# Patient Record
Sex: FEMALE | Race: BLACK OR AFRICAN AMERICAN | NOT HISPANIC OR LATINO | URBAN - METROPOLITAN AREA
[De-identification: names, ages, dates, MRNs, and addresses within clinical notes are randomized per-mention and may not be internally consistent; named-entity substitution may affect disease eponyms.]

---

## 2017-01-03 ENCOUNTER — EMERGENCY (EMERGENCY)
Facility: HOSPITAL | Age: 67
LOS: 1 days | Discharge: HOME | End: 2017-01-03
Attending: EMERGENCY MEDICINE | Admitting: EMERGENCY MEDICINE
Payer: COMMERCIAL

## 2017-01-03 VITALS
SYSTOLIC BLOOD PRESSURE: 146 MMHG | HEART RATE: 72 BPM | OXYGEN SATURATION: 97 % | TEMPERATURE: 98 F | RESPIRATION RATE: 18 BRPM | DIASTOLIC BLOOD PRESSURE: 84 MMHG

## 2017-01-03 VITALS
SYSTOLIC BLOOD PRESSURE: 138 MMHG | OXYGEN SATURATION: 98 % | DIASTOLIC BLOOD PRESSURE: 82 MMHG | HEART RATE: 66 BPM | RESPIRATION RATE: 18 BRPM | TEMPERATURE: 98 F

## 2017-01-03 DIAGNOSIS — Z90.710 ACQUIRED ABSENCE OF BOTH CERVIX AND UTERUS: Chronic | ICD-10-CM

## 2017-01-03 DIAGNOSIS — M79.662 PAIN IN LEFT LOWER LEG: ICD-10-CM

## 2017-01-03 DIAGNOSIS — O00.1 TUBAL PREGNANCY: Chronic | ICD-10-CM

## 2017-01-03 DIAGNOSIS — R25.2 CRAMP AND SPASM: ICD-10-CM

## 2017-01-03 DIAGNOSIS — Z98.89 OTHER SPECIFIED POSTPROCEDURAL STATES: Chronic | ICD-10-CM

## 2017-01-03 DIAGNOSIS — Z79.899 OTHER LONG TERM (CURRENT) DRUG THERAPY: ICD-10-CM

## 2017-01-03 LAB
ALBUMIN SERPL ELPH-MCNC: 3.5 G/DL — SIGNIFICANT CHANGE UP (ref 3.4–5)
ALP SERPL-CCNC: 56 U/L — SIGNIFICANT CHANGE UP (ref 40–120)
ALT FLD-CCNC: 21 U/L — SIGNIFICANT CHANGE UP (ref 12–42)
ANION GAP SERPL CALC-SCNC: 8 MMOL/L — LOW (ref 9–16)
APTT BLD: 31 SEC — SIGNIFICANT CHANGE UP (ref 27.5–37.4)
AST SERPL-CCNC: 17 U/L — SIGNIFICANT CHANGE UP (ref 15–37)
BASOPHILS NFR BLD AUTO: 0.5 % — SIGNIFICANT CHANGE UP (ref 0–2)
BILIRUB SERPL-MCNC: 0.3 MG/DL — SIGNIFICANT CHANGE UP (ref 0.2–1.2)
BUN SERPL-MCNC: 26 MG/DL — HIGH (ref 7–23)
CALCIUM SERPL-MCNC: 8.9 MG/DL — SIGNIFICANT CHANGE UP (ref 8.5–10.5)
CHLORIDE SERPL-SCNC: 111 MMOL/L — HIGH (ref 96–108)
CK SERPL-CCNC: 183 U/L — SIGNIFICANT CHANGE UP (ref 26–192)
CO2 SERPL-SCNC: 23 MMOL/L — SIGNIFICANT CHANGE UP (ref 22–31)
CREAT SERPL-MCNC: 1.06 MG/DL — SIGNIFICANT CHANGE UP (ref 0.5–1.3)
EOSINOPHIL NFR BLD AUTO: 3.4 % — SIGNIFICANT CHANGE UP (ref 0–6)
GLUCOSE SERPL-MCNC: 106 MG/DL — HIGH (ref 70–99)
HCT VFR BLD CALC: 36.5 % — SIGNIFICANT CHANGE UP (ref 34.5–45)
HGB BLD-MCNC: 12.3 G/DL — SIGNIFICANT CHANGE UP (ref 11.5–15.5)
INR BLD: 0.96 — SIGNIFICANT CHANGE UP (ref 0.88–1.16)
LYMPHOCYTES # BLD AUTO: 35.2 % — SIGNIFICANT CHANGE UP (ref 13–44)
MAGNESIUM SERPL-MCNC: 2.1 MG/DL — SIGNIFICANT CHANGE UP (ref 1.6–2.4)
MCHC RBC-ENTMCNC: 30.9 PG — SIGNIFICANT CHANGE UP (ref 27–34)
MCHC RBC-ENTMCNC: 33.7 G/DL — SIGNIFICANT CHANGE UP (ref 32–36)
MCV RBC AUTO: 91.7 FL — SIGNIFICANT CHANGE UP (ref 80–100)
MONOCYTES NFR BLD AUTO: 6 % — SIGNIFICANT CHANGE UP (ref 2–14)
NEUTROPHILS NFR BLD AUTO: 54.9 % — SIGNIFICANT CHANGE UP (ref 43–77)
PLATELET # BLD AUTO: 206 K/UL — SIGNIFICANT CHANGE UP (ref 150–400)
POTASSIUM SERPL-MCNC: 4 MMOL/L — SIGNIFICANT CHANGE UP (ref 3.5–5.3)
POTASSIUM SERPL-SCNC: 4 MMOL/L — SIGNIFICANT CHANGE UP (ref 3.5–5.3)
PROT SERPL-MCNC: 6.8 G/DL — SIGNIFICANT CHANGE UP (ref 6.4–8.2)
PROTHROM AB SERPL-ACNC: 10.6 SEC — SIGNIFICANT CHANGE UP (ref 10–13.1)
RBC # BLD: 3.98 M/UL — SIGNIFICANT CHANGE UP (ref 3.8–5.2)
RBC # FLD: 13.3 % — SIGNIFICANT CHANGE UP (ref 10.3–16.9)
SODIUM SERPL-SCNC: 142 MMOL/L — SIGNIFICANT CHANGE UP (ref 135–145)
WBC # BLD: 4.2 K/UL — SIGNIFICANT CHANGE UP (ref 3.8–10.5)
WBC # FLD AUTO: 4.2 K/UL — SIGNIFICANT CHANGE UP (ref 3.8–10.5)

## 2017-01-03 PROCEDURE — 80053 COMPREHEN METABOLIC PANEL: CPT

## 2017-01-03 PROCEDURE — 99053 MED SERV 10PM-8AM 24 HR FAC: CPT

## 2017-01-03 PROCEDURE — 99284 EMERGENCY DEPT VISIT MOD MDM: CPT | Mod: 25

## 2017-01-03 PROCEDURE — 99285 EMERGENCY DEPT VISIT HI MDM: CPT | Mod: 25

## 2017-01-03 PROCEDURE — 93971 EXTREMITY STUDY: CPT

## 2017-01-03 PROCEDURE — 85730 THROMBOPLASTIN TIME PARTIAL: CPT

## 2017-01-03 PROCEDURE — 85025 COMPLETE CBC W/AUTO DIFF WBC: CPT

## 2017-01-03 PROCEDURE — 82550 ASSAY OF CK (CPK): CPT

## 2017-01-03 PROCEDURE — 93971 EXTREMITY STUDY: CPT | Mod: 26

## 2017-01-03 PROCEDURE — 83735 ASSAY OF MAGNESIUM: CPT

## 2017-01-03 PROCEDURE — 36415 COLL VENOUS BLD VENIPUNCTURE: CPT

## 2017-01-03 PROCEDURE — 85610 PROTHROMBIN TIME: CPT

## 2017-01-03 NOTE — ED PROVIDER NOTE - OBJECTIVE STATEMENT
66F hx breast ca, high chol (on simvastatin), c/o LLE pain. pt state pain awoke her from sleep. states cramping from L hip down to toes. states her toes were bending in different directions.  no chest pain, no SOB. no recent travel.  states pain improved slightly with massage.  mild swelling to both legs.  no abd pain. no n/v/d.

## 2017-01-03 NOTE — ED PROVIDER NOTE - PMH
Breast cancer  Right, S/P  right lumpectomy, S/P chemotherapy, S/P radiation 2011  Hypercholesterolemia    Left knee injury, initial encounter

## 2017-01-03 NOTE — ED PROVIDER NOTE - MEDICAL DECISION MAKING DETAILS
LLE cramping, no numbness/weakness, no focal neuro deficits, no chest pain, no SOB  -check labs, US to r/o DVT

## 2017-01-03 NOTE — ED ADULT NURSE NOTE - PSH
Ectopic pregnancy, tubal    S/P arthroscopic surgery of right knee    S/P breast lumpectomy  Right breast lumectomy 2011  Wide excision of margins right breast 2012  S/P hysterectomy

## 2017-01-03 NOTE — ED ADULT NURSE NOTE - CHPI ED SYMPTOMS NEG
no abrasion/no tenderness/no neck tenderness/no weakness/no back pain/no inflammation/no arthralgias/no edema/no stiffness/no tingling/no deformity/no numbness/no joint swelling/no bruising

## 2017-01-03 NOTE — ED PROVIDER NOTE - PROGRESS NOTE DETAILS
no DVT on US, steady gait, no further cramping, sensation and strength intact. recommend PMD f/u  I have discussed the discharge plan with the patient. The patient agrees with the plan, as discussed.  The patient understands Emergency Department diagnosis is a preliminary diagnosis often based on limited information and that the patient must adhere to the follow-up plan as discussed.  The patient understands that if the symptoms worsen the patient may return to the Emergency Department at any time for further evaluation and treatment.

## 2017-01-03 NOTE — ED PROVIDER NOTE - CONDUCTED A DETAILED DISCUSSION WITH PATIENT AND/OR GUARDIAN REGARDING, MDM
need for outpatient follow-up/return to ED if symptoms worsen, persist or questions arise/lab results/radiology results

## 2022-04-11 ENCOUNTER — TRANSCRIPTION ENCOUNTER (OUTPATIENT)
Age: 72
End: 2022-04-11

## 2022-04-11 ENCOUNTER — APPOINTMENT (OUTPATIENT)
Dept: BREAST CENTER | Facility: CLINIC | Age: 72
End: 2022-04-11
Payer: MEDICARE

## 2022-04-11 VITALS
BODY MASS INDEX: 32.46 KG/M2 | SYSTOLIC BLOOD PRESSURE: 115 MMHG | HEIGHT: 65 IN | DIASTOLIC BLOOD PRESSURE: 80 MMHG | WEIGHT: 194.8 LBS | HEART RATE: 75 BPM

## 2022-04-11 PROCEDURE — 99213 OFFICE O/P EST LOW 20 MIN: CPT

## 2022-04-11 RX ORDER — HYDROCHLOROTHIAZIDE 50 MG
TABLET ORAL
Refills: 0 | Status: ACTIVE | COMMUNITY

## 2022-04-11 NOTE — PHYSICAL EXAM
[de-identified] : R breast/axilla/supraclavicular area: No evidence of recurrence\par  [de-identified] : L breast/axilla/supraclavicular area: No masses, discharge, or adenopathy\par

## 2022-04-11 NOTE — HISTORY OF PRESENT ILLNESS
[FreeTextEntry1] : 4/11/22 Patient with history of right breast cancer in 2011, here for breast cancer surveillance. She denies palpable breast masses or nipple discharge.\par \par 11/2/2016-bilateral mammogram-JESSICA\par 11/3/2017-bilateral mammogram-JESSICA\par 11/9/2018-jatin mammogram-no evidence of disease\par 11/11/2019-bilateral mammogram-JESSICA\par 11/18/20 jatin mg -JESSICA\par 11/27/21: B/L MG- Fibroglandular. R postsurgical changes. Benign appearing calcs. L UOQ benign appearing intramammary LN. BIRADS 2

## 2023-01-03 ENCOUNTER — NON-APPOINTMENT (OUTPATIENT)
Age: 73
End: 2023-01-03

## 2023-01-05 ENCOUNTER — APPOINTMENT (OUTPATIENT)
Dept: BREAST CENTER | Facility: CLINIC | Age: 73
End: 2023-01-05

## 2023-01-26 ENCOUNTER — NON-APPOINTMENT (OUTPATIENT)
Age: 73
End: 2023-01-26

## 2023-01-26 ENCOUNTER — APPOINTMENT (OUTPATIENT)
Dept: BREAST CENTER | Facility: CLINIC | Age: 73
End: 2023-01-26
Payer: MEDICARE

## 2023-01-26 VITALS
HEART RATE: 77 BPM | SYSTOLIC BLOOD PRESSURE: 126 MMHG | DIASTOLIC BLOOD PRESSURE: 82 MMHG | WEIGHT: 190.4 LBS | HEIGHT: 64.17 IN | BODY MASS INDEX: 32.5 KG/M2

## 2023-01-26 DIAGNOSIS — Z78.9 OTHER SPECIFIED HEALTH STATUS: ICD-10-CM

## 2023-01-26 PROCEDURE — 99213 OFFICE O/P EST LOW 20 MIN: CPT

## 2023-01-26 RX ORDER — CHLORHEXIDINE GLUCONATE 4 %
LIQUID (ML) TOPICAL
Refills: 0 | Status: ACTIVE | COMMUNITY

## 2023-01-26 RX ORDER — CHROMIUM 200 MCG
TABLET ORAL
Refills: 0 | Status: ACTIVE | COMMUNITY

## 2023-01-26 NOTE — HISTORY OF PRESENT ILLNESS
[FreeTextEntry1] : Patient is a 71yo F here for breast cancer surveillance. She has acute complaint of b/l breast pain (R>L) for 1 month. Hx of RIGHT nloc lumpx & SNB 12/2011 (age 60) yielding 0.9cm and 0.4cm IDC (ER+/IA+/HER2-), 0/1LN, positive margins. S/p re-excision lumpx 1/2012 yielding benign tissue. S/p chemo, RT, and Letrozole. Denies fhx of breast or ovarian cancer. Patient denies palpable masses, skin changes, or nipple discharge bilaterally.\par \par 11/2/16: B/L MG- JESSICA\par 11/3/17: B/L MG- JESSICA\par 11/9/18: B/L MG- no evidence of disease\par 11/11/19: B/L MG- JESSICA\par 11/18/20: B/L MG- JESSICA\par 11/27/21: B/L MG- Fibroglandular. R postsurgical changes. Benign appearing calcs. L UOQ benign appearing intramammary LN. BIRADS 2\par 12/2/22: B/l MG & US- scattered fibroglandular. R post lumpx stable changes. B/l benign appearing LN. JESSICA. BI-RADS 2

## 2023-01-26 NOTE — PHYSICAL EXAM
[de-identified] : R breast/axilla/supraclavicular area: No evidence of recurrence\par  [de-identified] : L breast/axilla/supraclavicular area: No masses, discharge, or adenopathy

## 2023-07-10 RX ORDER — LETROZOLE TABLETS 2.5 MG/1
TABLET, FILM COATED ORAL
Refills: 0 | Status: DISCONTINUED | COMMUNITY
End: 2023-07-10

## 2023-12-20 ENCOUNTER — NON-APPOINTMENT (OUTPATIENT)
Age: 73
End: 2023-12-20

## 2023-12-21 ENCOUNTER — APPOINTMENT (OUTPATIENT)
Dept: BREAST CENTER | Facility: CLINIC | Age: 73
End: 2023-12-21
Payer: MEDICARE

## 2023-12-21 ENCOUNTER — NON-APPOINTMENT (OUTPATIENT)
Age: 73
End: 2023-12-21

## 2023-12-21 VITALS
DIASTOLIC BLOOD PRESSURE: 79 MMHG | WEIGHT: 176 LBS | HEART RATE: 72 BPM | SYSTOLIC BLOOD PRESSURE: 117 MMHG | HEIGHT: 64.17 IN | BODY MASS INDEX: 30.05 KG/M2

## 2023-12-21 DIAGNOSIS — Z85.3 ENCOUNTER FOR FOLLOW-UP EXAMINATION AFTER COMPLETED TREATMENT FOR MALIGNANT NEOPLASM: ICD-10-CM

## 2023-12-21 DIAGNOSIS — Z85.3 PERSONAL HISTORY OF MALIGNANT NEOPLASM OF BREAST: ICD-10-CM

## 2023-12-21 DIAGNOSIS — Z08 ENCOUNTER FOR FOLLOW-UP EXAMINATION AFTER COMPLETED TREATMENT FOR MALIGNANT NEOPLASM: ICD-10-CM

## 2023-12-21 PROCEDURE — 99213 OFFICE O/P EST LOW 20 MIN: CPT

## 2023-12-21 RX ORDER — IBANDRONATE SODIUM 150 MG/1
150 TABLET, FILM COATED ORAL
Refills: 0 | Status: DISCONTINUED | COMMUNITY
End: 2023-12-21

## 2023-12-21 RX ORDER — ROSUVASTATIN CALCIUM 5 MG/1
TABLET, FILM COATED ORAL
Refills: 0 | Status: ACTIVE | COMMUNITY

## 2023-12-21 RX ORDER — SIMVASTATIN 40 MG/1
40 TABLET, FILM COATED ORAL
Refills: 0 | Status: DISCONTINUED | COMMUNITY
End: 2023-12-21

## 2023-12-21 NOTE — HISTORY OF PRESENT ILLNESS
[FreeTextEntry1] : Patient is a 74yo F here for breast cancer surveillance. Hx of RIGHT nloc lumpx & SNB 12/2011 (age 60) yielding 0.9cm and 0.4cm IDC (ER+/ID+/HER2-), 0/1LN, positive margins. S/p re-excision lumpx 1/2012 yielding benign tissue. S/p chemo, RT, and Letrozole. Denies fhx of breast or ovarian cancer. Patient denies palpable masses, skin changes, or nipple discharge bilaterally.  11/2/16: B/L MG- JESSICA 11/3/17: B/L MG- JESSICA 11/9/18: B/L MG- no evidence of disease 11/11/19: B/L MG- JESSICA 11/18/20: B/L MG- JESSICA 11/27/21: B/L MG- Fibroglandular. R postsurgical changes. Benign appearing calcs. L UOQ benign appearing intramammary LN. BIRADS 2 12/2/22: B/l MG & US- scattered fibroglandular. R post lumpx stable changes. B/l benign appearing LN. JESSICA. BI-RADS 2  12/4/23: B/l MG- scattered fibroglandular. R postlumpx stable scarring. L stable nodule UOQ. BI-RADS 2

## 2023-12-21 NOTE — PHYSICAL EXAM
[de-identified] : R breast/axilla/supraclavicular area: No evidence of recurrence\par   [de-identified] : L breast/axilla/supraclavicular area: No masses, discharge, or adenopathy

## 2024-01-20 ENCOUNTER — NON-APPOINTMENT (OUTPATIENT)
Age: 74
End: 2024-01-20

## 2024-12-10 ENCOUNTER — APPOINTMENT (OUTPATIENT)
Dept: BREAST CENTER | Facility: CLINIC | Age: 74
End: 2024-12-10
Payer: MEDICARE

## 2024-12-10 VITALS
DIASTOLIC BLOOD PRESSURE: 78 MMHG | BODY MASS INDEX: 28.85 KG/M2 | HEIGHT: 64.17 IN | WEIGHT: 169 LBS | HEART RATE: 74 BPM | SYSTOLIC BLOOD PRESSURE: 130 MMHG

## 2024-12-10 DIAGNOSIS — Z85.3 ENCOUNTER FOR FOLLOW-UP EXAMINATION AFTER COMPLETED TREATMENT FOR MALIGNANT NEOPLASM: ICD-10-CM

## 2024-12-10 DIAGNOSIS — Z85.3 PERSONAL HISTORY OF MALIGNANT NEOPLASM OF BREAST: ICD-10-CM

## 2024-12-10 DIAGNOSIS — Z08 ENCOUNTER FOR FOLLOW-UP EXAMINATION AFTER COMPLETED TREATMENT FOR MALIGNANT NEOPLASM: ICD-10-CM

## 2024-12-10 PROCEDURE — 99213 OFFICE O/P EST LOW 20 MIN: CPT

## 2025-04-08 ENCOUNTER — NON-APPOINTMENT (OUTPATIENT)
Age: 75
End: 2025-04-08

## 2025-04-12 ENCOUNTER — NON-APPOINTMENT (OUTPATIENT)
Age: 75
End: 2025-04-12